# Patient Record
Sex: MALE | Race: OTHER | HISPANIC OR LATINO
[De-identification: names, ages, dates, MRNs, and addresses within clinical notes are randomized per-mention and may not be internally consistent; named-entity substitution may affect disease eponyms.]

---

## 2017-05-03 ENCOUNTER — APPOINTMENT (OUTPATIENT)
Dept: CARDIOLOGY | Facility: CLINIC | Age: 37
End: 2017-05-03

## 2017-05-03 ENCOUNTER — NON-APPOINTMENT (OUTPATIENT)
Age: 37
End: 2017-05-03

## 2017-05-03 VITALS
HEIGHT: 66 IN | SYSTOLIC BLOOD PRESSURE: 137 MMHG | HEART RATE: 68 BPM | DIASTOLIC BLOOD PRESSURE: 79 MMHG | RESPIRATION RATE: 17 BRPM | OXYGEN SATURATION: 96 % | WEIGHT: 254 LBS | BODY MASS INDEX: 40.82 KG/M2

## 2017-05-03 RX ORDER — LEVOTHYROXINE SODIUM 137 UG/1
TABLET ORAL
Refills: 0 | Status: ACTIVE | COMMUNITY

## 2017-07-18 ENCOUNTER — RESULT REVIEW (OUTPATIENT)
Age: 37
End: 2017-07-18

## 2017-08-03 ENCOUNTER — OUTPATIENT (OUTPATIENT)
Dept: OUTPATIENT SERVICES | Facility: HOSPITAL | Age: 37
LOS: 1 days | End: 2017-08-03
Payer: COMMERCIAL

## 2017-08-03 ENCOUNTER — APPOINTMENT (OUTPATIENT)
Dept: ULTRASOUND IMAGING | Facility: HOSPITAL | Age: 37
End: 2017-08-03
Payer: COMMERCIAL

## 2017-08-03 DIAGNOSIS — I86.1 SCROTAL VARICES: ICD-10-CM

## 2017-08-03 DIAGNOSIS — N50.3 CYST OF EPIDIDYMIS: ICD-10-CM

## 2017-08-03 PROBLEM — Z00.00 ENCOUNTER FOR PREVENTIVE HEALTH EXAMINATION: Noted: 2017-08-03

## 2017-08-03 PROCEDURE — 76870 US EXAM SCROTUM: CPT | Mod: 26

## 2017-08-03 PROCEDURE — 76870 US EXAM SCROTUM: CPT

## 2017-10-16 ENCOUNTER — TRANSCRIPTION ENCOUNTER (OUTPATIENT)
Age: 37
End: 2017-10-16

## 2018-10-11 ENCOUNTER — TRANSCRIPTION ENCOUNTER (OUTPATIENT)
Age: 38
End: 2018-10-11

## 2018-10-13 ENCOUNTER — TRANSCRIPTION ENCOUNTER (OUTPATIENT)
Age: 38
End: 2018-10-13

## 2018-12-27 ENCOUNTER — TRANSCRIPTION ENCOUNTER (OUTPATIENT)
Age: 38
End: 2018-12-27

## 2019-05-23 ENCOUNTER — APPOINTMENT (OUTPATIENT)
Dept: MRI IMAGING | Facility: HOSPITAL | Age: 39
End: 2019-05-23
Payer: MEDICAID

## 2019-05-23 ENCOUNTER — OUTPATIENT (OUTPATIENT)
Dept: OUTPATIENT SERVICES | Facility: HOSPITAL | Age: 39
LOS: 1 days | End: 2019-05-23
Payer: MEDICAID

## 2019-05-23 ENCOUNTER — APPOINTMENT (OUTPATIENT)
Dept: ULTRASOUND IMAGING | Facility: HOSPITAL | Age: 39
End: 2019-05-23

## 2019-05-23 DIAGNOSIS — Z00.8 ENCOUNTER FOR OTHER GENERAL EXAMINATION: ICD-10-CM

## 2019-05-23 PROCEDURE — 70551 MRI BRAIN STEM W/O DYE: CPT | Mod: 26

## 2019-05-23 PROCEDURE — 70551 MRI BRAIN STEM W/O DYE: CPT

## 2019-05-23 PROCEDURE — 76882 US LMTD JT/FCL EVL NVASC XTR: CPT

## 2019-05-23 PROCEDURE — 76882 US LMTD JT/FCL EVL NVASC XTR: CPT | Mod: 26

## 2019-05-31 ENCOUNTER — TRANSCRIPTION ENCOUNTER (OUTPATIENT)
Age: 39
End: 2019-05-31

## 2019-06-12 ENCOUNTER — APPOINTMENT (OUTPATIENT)
Dept: NEUROLOGY | Facility: CLINIC | Age: 39
End: 2019-06-12
Payer: MEDICAID

## 2019-06-12 VITALS
SYSTOLIC BLOOD PRESSURE: 119 MMHG | WEIGHT: 258 LBS | HEIGHT: 66 IN | TEMPERATURE: 97.1 F | BODY MASS INDEX: 41.46 KG/M2 | HEART RATE: 70 BPM | DIASTOLIC BLOOD PRESSURE: 81 MMHG

## 2019-06-12 DIAGNOSIS — Z86.69 PERSONAL HISTORY OF OTHER DISEASES OF THE NERVOUS SYSTEM AND SENSE ORGANS: ICD-10-CM

## 2019-06-12 DIAGNOSIS — Z78.9 OTHER SPECIFIED HEALTH STATUS: ICD-10-CM

## 2019-06-12 DIAGNOSIS — Z86.39 PERSONAL HISTORY OF OTHER ENDOCRINE, NUTRITIONAL AND METABOLIC DISEASE: ICD-10-CM

## 2019-06-12 DIAGNOSIS — G93.5 COMPRESSION OF BRAIN: ICD-10-CM

## 2019-06-12 DIAGNOSIS — R51 HEADACHE: ICD-10-CM

## 2019-06-12 DIAGNOSIS — G43.919 MIGRAINE, UNSPECIFIED, INTRACTABLE, W/OUT STATUS MIGRAINOSUS: ICD-10-CM

## 2019-06-12 DIAGNOSIS — E23.7 DISORDER OF PITUITARY GLAND, UNSPECIFIED: ICD-10-CM

## 2019-06-12 DIAGNOSIS — G43.909 MIGRAINE, UNSPECIFIED, NOT INTRACTABLE, W/OUT STATUS MIGRAINOSUS: ICD-10-CM

## 2019-06-12 DIAGNOSIS — R26.89 OTHER ABNORMALITIES OF GAIT AND MOBILITY: ICD-10-CM

## 2019-06-12 DIAGNOSIS — E03.9 HYPOTHYROIDISM, UNSPECIFIED: ICD-10-CM

## 2019-06-12 PROCEDURE — 99204 OFFICE O/P NEW MOD 45 MIN: CPT

## 2019-06-12 RX ORDER — SUMATRIPTAN 100 MG/1
100 TABLET, FILM COATED ORAL
Qty: 12 | Refills: 5 | Status: ACTIVE | COMMUNITY
Start: 2019-06-12 | End: 1900-01-01

## 2019-06-12 RX ORDER — TOPIRAMATE 25 MG/1
25 TABLET, FILM COATED ORAL TWICE DAILY
Qty: 60 | Refills: 5 | Status: ACTIVE | COMMUNITY
Start: 2019-06-12 | End: 1900-01-01

## 2019-06-12 NOTE — REASON FOR VISIT
[Acute] : an acute visit [FreeTextEntry1] : Migrans on the side of the head,left eye redness,neck pain on the left side

## 2019-06-12 NOTE — HISTORY OF PRESENT ILLNESS
[FreeTextEntry1] : Patient    presented     for   evaluation  of     headaches.  He    has   been   having   headaches "on  and off"   for   a  few    years,  but   since    recently   headaches    became    more    severe   and   intense,  mostly    involving  the    L  side, throbbing,  at  times   associated   with    pressure    in the   L   eye,   sensitivity  to  light.  He is     also   c/o  neck pain.  He  recently   had  an  MRI  of    the  brain    which    is    c/w    Chiari   I   malformation.  He  is   also    c/o  occasional   difficulties     with    balance.  He    denies   clear     association    of   headaches     with    Valsalva.

## 2019-06-12 NOTE — PHYSICAL EXAM
[General Appearance - In No Acute Distress] : in no acute distress [General Appearance - Alert] : alert [Oriented To Time, Place, And Person] : oriented to person, place, and time [Impaired Insight] : insight and judgment were intact [Affect] : the affect was normal [Person] : oriented to person [Place] : oriented to place [Time] : oriented to time [Concentration Intact] : normal concentrating ability [Visual Intact] : visual attention was ~T not ~L decreased [Naming Objects] : no difficulty naming common objects [Writing A Sentence] : no difficulty writing a sentence [Repeating Phrases] : no difficulty repeating a phrase [Fluency] : fluency intact [Comprehension] : comprehension intact [Cranial Nerves Optic (II)] : visual acuity intact bilaterally,  visual fields full to confrontation, pupils equal round and reactive to light [Reading] : reading intact [Past History] : adequate knowledge of personal past history [Cranial Nerves Oculomotor (III)] : extraocular motion intact [Cranial Nerves Trigeminal (V)] : facial sensation intact symmetrically [Cranial Nerves Facial (VII)] : face symmetrical [Cranial Nerves Vestibulocochlear (VIII)] : hearing was intact bilaterally [Cranial Nerves Accessory (XI - Cranial And Spinal)] : head turning and shoulder shrug symmetric [Cranial Nerves Glossopharyngeal (IX)] : tongue and palate midline [Motor Tone] : muscle tone was normal in all four extremities [Motor Strength] : muscle strength was normal in all four extremities [Cranial Nerves Hypoglossal (XII)] : there was no tongue deviation with protrusion [Sensation Tactile Decrease] : light touch was intact [No Muscle Atrophy] : normal bulk in all four extremities [Abnormal Walk] : normal gait [Balance] : balance was intact [2+] : Ankle jerk left 2+ [Past-pointing] : there was no past-pointing [Tremor] : no tremor present [Plantar Reflex Right Only] : normal on the right [Plantar Reflex Left Only] : normal on the left

## 2019-06-12 NOTE — DISCUSSION/SUMMARY
[FreeTextEntry1] : There    is   no  clear     evidence    that    HAs   related   to  Chiari    malformation,  he     will  need  a    CSF   flow   study.  Will  refer    to  Neurosurgery.  I  personally    reviewed    his    recent   MRI of    the    brain. \par Clinically   headaches   are    consistent    with    migraine    headaches\par He    has  a    h/o  sleep  apnea   and  states     that    using  CPAP    on  a  regular   basis. \par Would    recommend  Topamax   for    migraine    prevention.  Sumatriptan  prn\par We   discussed   migraine     diet   and    life    style    modifications.

## 2019-06-12 NOTE — REVIEW OF SYSTEMS
[Anxiety] : anxiety [Tension Headache] : tension-type headaches [Red Eyes] : red eyes [Eye Pain] : eye pain [Abdominal Pain] : abdominal pain [Negative] : Heme/Lymph [FreeTextEntry2] : CHANGE IN WEIGHT,FATIGUE [de-identified] : OFF BALANCE [FreeTextEntry4] : RINGING IN EARS [FreeTextEntry7] : HEMORRHOIDS [FreeTextEntry8] : FREQUENT URINATION [FreeTextEntry9] : NECK PAIN,BACK PAIN

## 2019-07-08 ENCOUNTER — APPOINTMENT (OUTPATIENT)
Dept: NEUROLOGY | Facility: CLINIC | Age: 39
End: 2019-07-08
Payer: MEDICAID

## 2019-07-08 VITALS
DIASTOLIC BLOOD PRESSURE: 71 MMHG | WEIGHT: 258 LBS | BODY MASS INDEX: 41.46 KG/M2 | HEIGHT: 66 IN | SYSTOLIC BLOOD PRESSURE: 120 MMHG | HEART RATE: 62 BPM | TEMPERATURE: 98.1 F

## 2019-07-08 PROCEDURE — 99215 OFFICE O/P EST HI 40 MIN: CPT

## 2019-07-08 NOTE — PHYSICAL EXAM
[General Appearance - In No Acute Distress] : in no acute distress [Oriented To Time, Place, And Person] : oriented to person, place, and time [General Appearance - Alert] : alert [Impaired Insight] : insight and judgment were intact [Affect] : the affect was normal [Person] : oriented to person [Time] : oriented to time [Place] : oriented to place [Naming Objects] : no difficulty naming common objects [Visual Intact] : visual attention was ~T not ~L decreased [Concentration Intact] : normal concentrating ability [Fluency] : fluency intact [Repeating Phrases] : no difficulty repeating a phrase [Writing A Sentence] : no difficulty writing a sentence [Reading] : reading intact [Comprehension] : comprehension intact [Past History] : adequate knowledge of personal past history [Cranial Nerves Trigeminal (V)] : facial sensation intact symmetrically [Cranial Nerves Facial (VII)] : face symmetrical [Cranial Nerves Oculomotor (III)] : extraocular motion intact [Cranial Nerves Optic (II)] : visual acuity intact bilaterally,  visual fields full to confrontation, pupils equal round and reactive to light [Cranial Nerves Accessory (XI - Cranial And Spinal)] : head turning and shoulder shrug symmetric [Cranial Nerves Vestibulocochlear (VIII)] : hearing was intact bilaterally [Cranial Nerves Glossopharyngeal (IX)] : tongue and palate midline [Motor Tone] : muscle tone was normal in all four extremities [Cranial Nerves Hypoglossal (XII)] : there was no tongue deviation with protrusion [Motor Strength] : muscle strength was normal in all four extremities [Abnormal Walk] : normal gait [Sensation Tactile Decrease] : light touch was intact [No Muscle Atrophy] : normal bulk in all four extremities [Past-pointing] : there was no past-pointing [Tremor] : no tremor present [Balance] : balance was intact [Plantar Reflex Right Only] : normal on the right [2+] : Ankle jerk right 2+ [Plantar Reflex Left Only] : normal on the left

## 2019-07-08 NOTE — HISTORY OF PRESENT ILLNESS
[FreeTextEntry1] : Pt   appears   somewhat    anxious.    He    has  an   abundant    complaints   mostly    related    to   abnormal    sensation/paresthesias   in   variable   parts  of  the    body and   trying  to   tie   this   up   with   the   diagnosis  of  Chiari.  H e is   also   c/o   difficulties   with  sleep   and   occasional    difficulties   with   breathing.  He   states  that   he   had  an  extensive    work  up   for the latest  issue   and   whole   work up   has  been   negative.   He     did  not  start    taking   Topamax  and  did  not  try   Imitrex.   He    states     that  after   the    last  appointment   his   headaches   became   somewhat   better;   more     sensitivity   to  light   rather     than  pain.  He  states  that    yesterday   he   had  an  episode  of  intense   headache.    He   hasn't     been   seen   buy   Neurosurgery   yet.

## 2019-07-08 NOTE — DISCUSSION/SUMMARY
[FreeTextEntry1] : Based    on the    clinical  description  the   relation o the    current   symptoms   to the    Chiari  malformation  is   speculative.  \par However,  he   will  need  to  be  seen   by   Neurosurgery. he   might     need  a   CSF    flow  study\par we     again   discussed    migraine  diet   and   life   style   modifications.

## 2019-07-26 ENCOUNTER — APPOINTMENT (OUTPATIENT)
Dept: PAIN MANAGEMENT | Facility: CLINIC | Age: 39
End: 2019-07-26
Payer: MEDICAID

## 2019-07-26 VITALS
WEIGHT: 254 LBS | SYSTOLIC BLOOD PRESSURE: 124 MMHG | HEIGHT: 66 IN | DIASTOLIC BLOOD PRESSURE: 60 MMHG | BODY MASS INDEX: 40.82 KG/M2

## 2019-07-26 DIAGNOSIS — Z87.39 PERSONAL HISTORY OF OTHER DISEASES OF THE MUSCULOSKELETAL SYSTEM AND CONNECTIVE TISSUE: ICD-10-CM

## 2019-07-26 DIAGNOSIS — Z82.49 FAMILY HISTORY OF ISCHEMIC HEART DISEASE AND OTHER DISEASES OF THE CIRCULATORY SYSTEM: ICD-10-CM

## 2019-07-26 DIAGNOSIS — M25.559 PAIN IN UNSPECIFIED HIP: ICD-10-CM

## 2019-07-26 DIAGNOSIS — M79.18 MYALGIA, OTHER SITE: ICD-10-CM

## 2019-07-26 DIAGNOSIS — M54.6 PAIN IN THORACIC SPINE: ICD-10-CM

## 2019-07-26 DIAGNOSIS — M51.26 OTHER INTERVERTEBRAL DISC DISPLACEMENT, LUMBAR REGION: ICD-10-CM

## 2019-07-26 PROCEDURE — 99204 OFFICE O/P NEW MOD 45 MIN: CPT

## 2019-07-26 RX ORDER — ACETAMINOPHEN 160 MG/5ML
500 SUSPENSION ORAL
Refills: 0 | Status: ACTIVE | COMMUNITY

## 2019-07-26 NOTE — REVIEW OF SYSTEMS
[Feeling Tired] : fatigue [Abdominal Pain] : abdominal pain [Headache] : headache [Negative] : Heme/Lymph [de-identified] : weakness

## 2019-07-26 NOTE — HISTORY OF PRESENT ILLNESS
[Back Pain] : back pain [___ mths] : [unfilled] month(s) ago [Sharp] : sharp [7] : a maximum pain level of 7/10 [6] : a minimum pain level of 6/10 [Shooting] : shooting [Laying] : laying [Throbbing] : throbbing [Sitting] : sitting [Standing] : standing [Medications] : medications [FreeTextEntry1] : HPI\par \par Mr. SUSAN SANCHEZ is a 39 year M otherwise healthy presents with bilateral back pain radiating to bilateral groin, anterior thigh\par \par \par Previous and current pain medications/doses/effects:\par \par Tylenol without improvement\par \par Previous Pain Treatments:\par \par n/a\par \par Previous Pain Injections:\par \par n/a\par \par Previous Diagnostic Studies/Images:\par \par MRI LS 7/2019\par \par  FINDINGS AT SPECIFIC LEVELS:  \par  \par      L5-S1: Extremely thin intervertebral disc space related to transitional L5 vertebral body. No   \par bulge or herniation.  \par  \par      L4-L5: Mild facet osteoarthritis. No bulge or herniation.  \par  \par      L3-L4: Mild left facet osteoarthritis. Small, broad-based left foraminal and extraforaminal   \par disc herniation results in compression of the left L3 nerve root at the lateral aspect of the neural  \par foramen. Disc herniation is best demonstrated on axial images 8-10 series 10, sagittal image 6   \par series 8.  \par  \par      L2-L3: Minimal disc bulge extends to either side of the vertebral body.  \par  \par      L1-L2: No bulge or herniation.  \par  \par      T10-T11: Small, broad-based, right foraminal and extraforaminal disc herniation (image 137   \par series 7, images 10-12 series 4).  \par  \par  INTRADURAL SPACE AND CONUS MEDULLARIS: No lesion demonstrated.  \par  \par  BONE MARROW SIGNAL: No neoplastic replacement of bone marrow.  \par  \par  ALIGNMENT: Unremarkable.  \par  \par  PARASPINAL SOFT TISSUES: No paraspinal mass.  \par  \par  \par  \par  IMPRESSION: Small, broad-based left foraminal and extraforaminal L3-L4 disc herniation results in   \par compression of the left L3 nerve root at the lateral aspect of the neural foramen. Small, broad-  \par based, right foraminal and extraforaminal disc herniation at T10-T11.  \par  \par  There is a transitional vertebral body at the lumbosacral junction. A transitional vertebral body   \par is a variation of normal, but may result in discrepancy in labeling levels.  \par  \par \par  [FreeTextEntry7] : Lumbar/lf leg pain herniated disk  [de-identified] : numbness,pins and needles  [FreeTextEntry4] : inversion,decompression

## 2019-07-26 NOTE — PHYSICAL EXAM
[Facet Tenderness] : facet tenderness [Spine: Flexion to ___ degrees, without pain] : spine: flexion to [unfilled] degrees, without pain [UE/LE] : Sensory: Intact in bilateral upper & lower extremities [Cranial Nerves Trigeminal (V)] : facial sensation intact symmetrically [Normal] : Normal affect

## 2019-07-26 NOTE — ASSESSMENT
[FreeTextEntry1] : I personally reviewed the relevant imaging.  Discussed and explained to patient the likely source of pathology and pain.  Questions answered.\par \par Trial PT - referral provided\par \par may consider lumbar epidural steroid injection\par \par Tylenol at night <3g daily\par \par \par The above diagnosis and treatment plan is medically reasonable and necessary based on the patient encounter.\par \par There were no barriers to communication.\par Informed patient that I would be available for any additional questions.\par \par Discussed role of nsaids in pain management and all relevant risks, if patient is continuing to require after 4 weeks the patient should f/u for alternative treatment. \par \par Instructed patient to maintain pain diary to monitor pain level, mobility, and function.\par \par \par

## 2019-08-30 ENCOUNTER — APPOINTMENT (OUTPATIENT)
Dept: PAIN MANAGEMENT | Facility: CLINIC | Age: 39
End: 2019-08-30

## 2020-04-09 PROBLEM — M25.559 HIP PAIN: Status: RESOLVED | Noted: 2019-07-26 | Resolved: 2020-04-09

## 2020-05-13 ENCOUNTER — TRANSCRIPTION ENCOUNTER (OUTPATIENT)
Age: 40
End: 2020-05-13